# Patient Record
Sex: MALE | Race: WHITE | NOT HISPANIC OR LATINO | URBAN - METROPOLITAN AREA
[De-identification: names, ages, dates, MRNs, and addresses within clinical notes are randomized per-mention and may not be internally consistent; named-entity substitution may affect disease eponyms.]

---

## 2023-07-27 ENCOUNTER — TELEHEALTH PROVIDED IN PATIENT'S HOME (OUTPATIENT)
Dept: URBAN - METROPOLITAN AREA TELEHEALTH 12 | Facility: TELEHEALTH | Age: 23
End: 2023-07-27
Payer: MEDICAID

## 2023-07-27 VITALS — WEIGHT: 185 LBS | HEIGHT: 78 IN

## 2023-07-27 DIAGNOSIS — K59.1 FUNCTIONAL DIARRHEA: ICD-10-CM

## 2023-07-27 DIAGNOSIS — R11.2 NAUSEA WITH VOMITING, UNSPECIFIED: ICD-10-CM

## 2023-07-27 DIAGNOSIS — K21.9 GASTRO-ESOPHAGEAL REFLUX DISEASE WITHOUT ESOPHAGITIS: ICD-10-CM

## 2023-07-27 DIAGNOSIS — R12 HEARTBURN: ICD-10-CM

## 2023-07-27 DIAGNOSIS — R63.4 ABNORMAL WEIGHT LOSS: ICD-10-CM

## 2023-07-27 PROCEDURE — 99204 OFFICE O/P NEW MOD 45 MIN: CPT | Mod: 95 | Performed by: INTERNAL MEDICINE

## 2023-07-27 NOTE — SERVICENOTES
Patient's visit was conducted through [employer] video telecommunication. Patient consented before the start of visit as to understanding of privacy concerns, possible technological failure, and their responsibility of carrying out instructions of plan.

## 2023-07-27 NOTE — SERVICEHPINOTES
PATIENT VERIFIED BY DATE OF BIRTH AND NAME. Patient has been consented for this telecommunication visit.   23 yo male is referred for evaluation of nausea and vomiting. He notes ongoing GERD sx his whole life. He would wake up in the mornings, having to be careful with soda and dietary factors. He now is having trouble eating and losing weight. He states he lost 11 lbs unintentionally. He wakes up with a lot of nausea and no appetite. He smokes weed to try to gain an appetite. He state he smokes daily for about a year or two and admits to smoking a little more recently. He was told by primary care to try stopping it which he did for about a week and didn't seem to help. He used to get heartburn but not as much now and instead gets a squeezing pressure in his stomach and chest.  He is taking Omeprazole on and off for about a year which has helped and then will stop and resume if sx return. He notes diarrhea, can be loose or solid, notes improvement when nausea improves, also notes it can be greasy. He has had times when he was going 10x in a day. He has had labs at his PMD that were wnl and fecal calprotectin and CRP wnl.